# Patient Record
Sex: MALE | Race: WHITE
[De-identification: names, ages, dates, MRNs, and addresses within clinical notes are randomized per-mention and may not be internally consistent; named-entity substitution may affect disease eponyms.]

---

## 2018-08-01 ENCOUNTER — HOSPITAL ENCOUNTER (EMERGENCY)
Dept: HOSPITAL 45 - C.EDB | Age: 28
Discharge: HOME | End: 2018-08-01
Payer: SELF-PAY

## 2018-08-01 VITALS
DIASTOLIC BLOOD PRESSURE: 65 MMHG | OXYGEN SATURATION: 96 % | SYSTOLIC BLOOD PRESSURE: 139 MMHG | HEART RATE: 85 BPM | TEMPERATURE: 98.24 F

## 2018-08-01 VITALS — HEIGHT: 67.99 IN

## 2018-08-01 DIAGNOSIS — S30.1XXA: ICD-10-CM

## 2018-08-01 DIAGNOSIS — S89.91XA: Primary | ICD-10-CM
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
## 2018-08-01 DIAGNOSIS — S80.02XA: ICD-10-CM
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
## 2018-08-01 NOTE — DIAGNOSTIC IMAGING REPORT
RIGHT KNEE 2 VIEWS



HISTORY:      R knee trauma



COMPARISON: None.



FINDINGS: There is no fracture or dislocation. Prepatellar soft tissue swelling

and a small knee effusion. Mild thickening at the distal quadriceps tendon. No

radiopaque foreign bodies.



IMPRESSION:  



1. No fracture or dislocation within the right knee.

2. Prepatellar soft tissue thickening and a small knee effusion.

3. Mild thickening of the distal quadriceps tendon. This could represent a

quadriceps tendon injury.







Electronically signed by:  Marques Tenorio M.D.

8/1/2018 12:05 PM



Dictated Date/Time:  8/1/2018 12:03 PM

## 2018-08-01 NOTE — EMERGENCY ROOM VISIT NOTE
History


First contact with patient:  11:07


Chief Complaint:  LEG PAIN,LEG INJURY


Stated Complaint:  TRUSS FELL ON LEG





History of Present Illness


The patient is a 28 year old male who presents to the Emergency Room with 

complaints of severe right knee pain.  The patient reports that he was helping 

to set a truss when a support board collapsed, and the truss fell approximately 

15 feet onto the right knee.  The patient reports mild left knee discomfort as 

well, but is able to apply weight to the leg without any pain.  He denies any 

injury to the head, neck, chest or abdomen.  He reports mild discomfort in the 

left flank region.  He denies any focal back or rib pain.  He denies any 

paresthesias or numbness of the right foot or toes, and rates his discomfort an 

8 out of 10.  Tetanus immunization is up-to-date.





Review of Systems


10 system review was performed and was negative except for pertinent positives 

and negatives as indicated in history of present illness





Past Medical/Surgical History


Medical Problems:


(1) No significant past medical history


Surgical Problems:


(1) No history of previous surgery








Family History


Unremarkable





Social History


Smoking Status:  Never Smoker


Alcohol Use:  none


Marital Status:  


Housing Status:  lives with family


Occupation Status:  employed





Current/Historical Medications


Scheduled PRN


Oxycodone Ir (Roxicodone Ir), 1 TAB PO Q4H PRN for Pain





Physical Exam


Vital Signs











  Date Time  Temp Pulse Resp B/P (MAP) Pulse Ox O2 Delivery O2 Flow Rate FiO2


 


8/1/18 11:06 36.8 97 20 119/67 94 Room Air  











Physical Exam


CONSTITUTIONAL:  Healthy and well nourished.  Alert and oriented X 3 with 

positive affect.  Patient appears in moderate discomfort.


HEENT:  Normocephalic, atraumatic.  Pupils equal, round and reactive.  


NECK:  Full active range of motion without discomfort.


RESPIRATORY:  Clear to auscultation bilaterally with no wheezing, crackles, 

rhonchi or stridor.


CARDIOVASCULAR:  Regular rate and rhythm with no murmurs, rubs or gallops.


GASTROINTESTINAL:  Bowel sounds present in all quadrants.  Soft and nontender 

to palpation.


MUSCULOSKELETAL: Examination shows a 3+ right joint effusion.  There is a 

superficial abrasion to the anterior knee.  I was unable to perform any range 

of motion because of discomfort.  He has mild tenderness to palpation over the 

medial and lateral joint line.  Varus and valgus stress does not show any 

obvious instability.  Quadriceps and patellar tendons are palpable, with no 

focal tenderness to palpation over the quadriceps or patellar tendons.  Patient 

has no tenderness to palpation about the mid to distal femur or proximal leg 

region.  Pedal pulses are intact.  Patient does have mild tenderness to 

palpation over the left flank soft tissue.  He has no tenderness to palpation 

across the posterior ribs or central thoracolumbar spine.  Examination of the 

left knee shows mild erythema across the anterior aspect of the knee, otherwise 

has no pain with flexion and extension.  Ligamentous exam is normal.


INTEGUMENTARY:  No rash or other significant dermatologic conditions noted.


NEUROLOGIC: Right foot and toes are sensory intact.





Medical Decision & Procedures


ER Provider


Diagnostic Interpretation:


My interpretation of right knee x-rays shows a joint effusion without any 

obvious fracture or dislocation.  Radiologist does question thickening of the 

quadriceps tendon.  Radiologist report is as follows:





RIGHT KNEE 2 VIEWS





HISTORY:      R knee trauma





COMPARISON: None.





FINDINGS: There is no fracture or dislocation. Prepatellar soft tissue swelling


and a small knee effusion. Mild thickening at the distal quadriceps tendon. No


radiopaque foreign bodies.





IMPRESSION:  





1. No fracture or dislocation within the right knee.


2. Prepatellar soft tissue thickening and a small knee effusion.


3. Mild thickening of the distal quadriceps tendon. This could represent a


quadriceps tendon injury.





Medications Administered











 Medications


  (Trade)  Dose


 Ordered  Sig/Justin


 Route  Start Time


 Stop Time Status Last Admin


Dose Admin


 


 Oxycodone HCl


  (Roxicodone


 Immediate Rel Tab)  10 mg  NOW  STAT


 PO  8/1/18 11:12


 8/1/18 11:13 DC 8/1/18 11:25


10 MG











ED Course


Patient history and physical exam were performed.  Nurse's notes were reviewed.

  Vital signs were reviewed and were normal.  The patient was administered 

OxyIR 10 mg for pain.  He refused any intramuscular or intravenous analgesics.  

An ice pack was also applied.  X-rays of the right knee were normal.  The 

radiologist questions thickening of the distal quadriceps tendon region.  It is 

noted that the patient has no tenderness to palpation or obvious palpable 

defect over this region.  A knee immobilizer and crutches were dispensed.  The 

patient was encouraged to intermittently apply ice and elevate the knee for 

swelling.  Ibuprofen and Tylenol in alternating fashion for baseline pain 

relief.  The patient was provided a prescription for OxyIR as needed for 

breakthrough pain.  The patient was given contact information for [facility] for further follow-up and management.  The patient voiced 

understanding of all discharge instructions, was happy with plan of care, and 

rated his discomfort a 3 out of 10 at the time of discharge.





Medical Decision








Blood Pressure Screening


Patient's blood pressure:  Normal blood pressure





Impression





 Primary Impression:  


 Right knee injury


 Additional Impressions:  


 Back contusion


 Contusion of left knee





Departure Information


Prescriptions





Oxycodone Ir (Roxicodone Ir) 5 Mg Tab


1 TAB PO Q4H Y for Pain, #15 TAB


   For Initial Treatment


   Prov: Higinio Thakkar PA         8/1/18





Patient Instructions


My [facility]





Problem Qualifiers

## 2018-08-15 ENCOUNTER — HOSPITAL ENCOUNTER (OUTPATIENT)
Dept: HOSPITAL 45 - X.SURG | Age: 28
Discharge: HOME | End: 2018-08-15
Attending: ORTHOPAEDIC SURGERY
Payer: COMMERCIAL

## 2018-08-15 VITALS
HEIGHT: 69.02 IN | WEIGHT: 180.38 LBS | BODY MASS INDEX: 26.72 KG/M2 | BODY MASS INDEX: 26.72 KG/M2 | HEIGHT: 69.02 IN | WEIGHT: 180.38 LBS

## 2018-08-15 VITALS — HEART RATE: 67 BPM | OXYGEN SATURATION: 97 % | SYSTOLIC BLOOD PRESSURE: 146 MMHG | DIASTOLIC BLOOD PRESSURE: 90 MMHG

## 2018-08-15 VITALS — TEMPERATURE: 98.42 F

## 2018-08-15 DIAGNOSIS — S83.251A: Primary | ICD-10-CM

## 2018-08-15 DIAGNOSIS — S86.811A: ICD-10-CM

## 2018-08-15 DIAGNOSIS — S83.411A: ICD-10-CM

## 2018-08-15 DIAGNOSIS — S83.511A: ICD-10-CM

## 2018-08-15 DIAGNOSIS — S83.521A: ICD-10-CM

## 2018-08-15 DIAGNOSIS — M89.9: ICD-10-CM
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
## 2018-08-15 NOTE — [facility] POST OPERATIVE BRIEF NOTE
Immediate Operative Summary


Operative Date


Aug 15, 2018.





Pre-Operative Diagnosis





Right Knee Lateral Meniscus Tear, Medial Collateral Ligament Tear, Patella 


Tendon Rupture, ACL Tear





Post-Operative Diagnosis





Right Knee Lateral Meniscus Tear, Medial Collateral Ligament Tear, Patella 


Tendon Rupture, ACL Tear, Partial PCL Tear, Grade 4 Chondral injury to LFC





Procedure(s) Performed





Right Knee Arthroscopy, Partial Lateral Meniscectomy, Patella Tendon Repair





Surgeon


Dr. Hernandes





Assistant Surgeon(s)


JENNIFER Ortiz PA-C





Estimated Blood Loss


Minimal





Findings


Consistent with Post-Op Diagnosis





Specimens





None





Drains


None





Anesthesia Type


General





Complication(s)


none





Disposition


Accompanied Pt To Recovery:  no


Disposition:  Recovery Room / PACU

## 2018-08-15 NOTE — ANESTHESIA PROGRESS NT - [facility]
Anesthesia Post Op Note


Date & Time


Aug 15, 2018 at 14:13





Vital Signs


Pain Intensity:  6.0





Vital Signs Past 12 Hours








  Date Time  Temp Pulse Resp B/P (MAP) Pulse Ox O2 Delivery O2 Flow Rate FiO2


 


8/15/18 13:38 37.3 88 16 150/96 100 Mask 6 


 


8/15/18 10:52 37.0 71 16 118/61 (80) 97 Room Air  











Notes


Mental Status:  alert / awake / arousable, participated in evaluation


Pt Amnestic to Procedure:  Yes


Nausea / Vomiting:  adequately controlled


Pain:  adequately controlled


Airway Patency, RR, SpO2:  stable & adequate


BP & HR:  stable & adequate


Hydration State:  stable & adequate


Anesthetic Complications:  no major complications apparent

## 2018-08-15 NOTE — DISCHARGE INSTRUCTIONS-[facility]
Discharge Instructions


Date of Service


Aug 15, 2018.





Visit


Reason for Visit:  Right Knee Lateral Meniscal Tear, Patella Tendon





Discharge


Discharge Diagnosis / Problem:  right knee lateral meniscus tear, ACL/MCL tear, 

patella tendon tear





Discharge Goals


Goal(s):  Decrease discomfort, Improve function, Therapeutic intervention





Medications


Stopped Medications Name(s):  


natural healing products





Activity Recommendations


Activity Limitations:  per Instructions/Follow-up section


Weightbearing Status:  Right weightbearing (as tolerated with brace. Do not 

bend knee )





Anesthesia


.





Post Anesthesia Instructions:





If you have had General Anesthesia or IV Sedation:





*  Do not drive today.


*  Resume driving when surgeon permits.


*  Do not make important decisions or sign legal documents today.


*  Call surgeon for:





   1.  Temperature elevations greater than 101 degrees F.


   2.  Uncontrollable pain.


   3.  Excessive bleeding.


   4.  Persistent nausea and vomiting.


   5.  Medication intolerance (nausea, vomiting or rash).





*  For nausea and vomiting use only clear liquids such as: tea, soda, bouillon 

until nausea subsides, then gradually increase diet as tolerated.





*  If you have any concerns or questions, call your surgeon's office.  If 

physician is unavailable and it is an emergency, call 911 or go to the nearest 

emergency room.





.





Instructions / Follow-Up


Instructions / Follow-Up





MEDICATIONS:





*  Resume previous medications unless instructed otherwise by your surgeon.





*  Always take pain medication on a full stomach or with food to avoid upset 

stomach. 





*  Do not drink alcohol or drive while taking narcotics.





*  Ibuprofen or Tylenol may be taken if narcotic not needed. No ibuprofen while 

taking toradol 








SPECIAL CARE INSTRUCTIONS:





__ None





_x_ Keep extremity elevated and iced x 48 hours; apply ice 20-30


    minutes 8-10 times/day. May remove at night.





_x_ Crutches


    __ May discard when able





_x_ Brace


    _x_ 24 hrs/day (DO NOT bend knee) 


    __ Remove at night





_x_ Dressing


    __ Maintain until seen in office, may shower with plastic over site


    _x_ Remove dressings in 48 hours and then may shower


    _x_ Cover incisions after showering


    __ Do not remove steri-strips





Call physician if chills or temperature rises above 102 degrees or


pain unrelieved by prescribed pain medications. 


Office [phone]








follow up in 2 weeks





Diet Recommendations


Home Diet:  resume previous diet





Procedures


Procedures Performed:  


Right Knee Arthroscopy, Partial Lateral Meniscectomy, Patella Tendon Repair





Pending Studies


Studies pending at discharge:  no





Medical Emergencies








.


Who to Call and When:





Medical Emergencies:  If at any time you feel your situation is an emergency, 

please call 911 immediately.





.





Non-Emergent Contact


Non-Emergency issues call your:  Surgeon





.


.








"Provider Documentation" section prepared by Robin Ortiz.








.

## 2018-08-15 NOTE — OPERATIVE REPORT
DATE OF OPERATION:  08/15/2018

 

SURGEON:  Mitul Hernandes M.D.

 

ASSISTANT:  KUSUM Mckeon

 

PREOPERATIVE DIAGNOSES:

1.  Right knee partial patella tendon rupture.

2.  Right knee bucket handle lateral meniscus tear.

3.  Right knee ACL tear.

4.  Right knee MCL tear.

5.  Right knee grade 1 PCL injury.

 

POSTOPERATIVE DIAGNOSES:

1.  Right knee bucket handle lateral meniscus tear.

2.  Right knee ACL tear.

3.  Right knee partial patella tendon tear.

4.  Right knee MCL tear.

5.  Grade 1 PCL tear.

6.  Large linear osteochondral lesion to the lateral femoral condyle.

 

OPERATIVE INDICATIONS:  The patient is a 28-year-old [facility] gentleman who

injured his knee just about two weeks ago.  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  He had acute onset of

pain, deformity, discomfort and swelling.  He was seen in clinic and his knee

was really swollen and difficult to diagnose.  Seen by my partner Dr. Sandoval. 

Over time with the imaging and MRI as well as clinical exam revealed a

partial or near-complete patellar tendon rupture, bucket handle lateral

meniscus tear, ACL tear, and an MCL tear off the femur.  There was a question

of possible a mild PCL injury as well.  The patient was now indicated for

surgical treatment of the critical portions of this which are the lateral

meniscus tear and the patella tendon tear.  I felt likely that the remainder

of these injuries would likely heal with conservative treatment and could be

addressed later if needed.

 

OPERATIVE FINDINGS:  Examination of the patient's knee under anesthesia

revealed a large knee effusion.  His range of motion passively was 0-135.  He

had a positive Lachman and agreed with a soft endpoint and a grade 2 pivot. 

He had grade 1 posterior drawer test.  He did have valgus laxity with a

fairly good endpoint.  No varus instability.  Kenzie's is negative for

mechanical symptoms.

 

OPERATIVE PROCEDURE:  The patient taken to the operating room, identified and

placed on the operative table in supine position.  All contact areas were

appropriately padded.  IV antibiotics was provided by anesthesia team. 

General anesthetic was implemented by anesthesia team.  A right thigh

tourniquet was then placed.  The right knee was then examined under

anesthesia.  The findings are as described.  The right leg was then prepped

and draped in usual sterile fashion.

 

The right leg was elevated, exsanguinated with Esmarch and tourniquet was

placed at 300 mmHg.  Routine right knee arthroscopy was then performed

through the anteromedial and anterolateral portals.  A superolateral flow

portal was established for outflow.  He had a large hemarthrosis.  It took a

little while to get rid of the hemarthrosis.  I then examined the knee. 

Medial meniscus was intact.  There was clear gapping in the medial

compartment.  There was a large bucket handle lateral meniscus tear.  I tried

to reduce this, but could not reduce it.  Therefore, I detached anteriorly

and then was able to push this posteriorly and then reduce it.  I then

detached the meniscus posteriorly and removed it.  I spent some time trimming

up the remainder of the meniscus.  It was essentially missing the entire

lateral meniscus.  Once this was complete, I examined the ACL and make sure

it was not impinging and it was clearly torn, but not out of position or

flipped anteriorly or cause any impingement.  There was a large osteochondral

lesion lateral femoral condyle, but no loose cartilage or bone that could be

addressed.  The scope was then removed from the knee.  The anterior lateral

portal was closed with 3-0 Prolene suture in a simple fashion.  Attention was

then drawn to the patella tendon repair.

 

The anterior medial portal was then extended superiorly and inferiorly. 

Sharp dissection was carried through subcutaneous tissue down to the level of

the extensor mechanism.  I mobilized the subcutaneous tissue.  I then

identified the partial patellar tendon rupture.  That was not easy to

identify as the lateral portion was still intact and then these fibers were

wavy and shortened.  I debrided the intervening tissue between the torn

tendon and the patella.  I debrided the base of the patella itself to allow

for reattachment.  I then placed a #5 FiberWire suture in a [place] fashion up

and down the medial side of the patellar tendon.  I then passed through drill

holes in the patella, one through the central portion and one through the

medial side.  The patella was then pulled down and tied and the patellar

tendon was reapproximated.  I could then flex the knee to 90 degrees without

any gapping, but that was about the maximum.  Attention was then drawn toward

closing.

 

The wound was irrigated with copious amounts of normal saline.  I injected

the knee with 30 mL of 0.5% ropivacaine with epinephrine.  The superior

lateral outflow portal was then closed with Prolene suture.  I then irrigated

the anterior wound extensively.  I injected locally with 30 mL of 0.5%

Marcaine with epinephrine.  The tourniquet was then let down for a tourniquet

time of 55 minutes.  Hemostasis was assured with use of electrocautery. 

Subcutaneous tissue was then closed with 2-0 Vicryl suture in buried

interrupted fashion.  Skin was closed with 3-0 nylon suture in a horizontal

mattress fashion.  The leg was then cleaned and dried and a sterile dressing

of Xeroform, 4 x 4's, sterile cast padding and Ace bandage were applied

followed by a cold pack and a knee immobilizer.  The patient then brought out

of general anesthesia and transferred to the recovery room in stable

condition.  The patient tolerated the procedure well with no complications. 

All needle and sponge counts were correct at the end of the operation.

 

ARTHROSCOPIC FINDINGS:  Arthroscopic findings revealed a large hemarthrosis. 

The undersurface of patella and trochlea were fairly normal.  In the

intercondylar notch, the ACL was intact.  I could not really see much injury

to the PCL due to the synovium.  In the medial compartment, the articular

surface was normal and the meniscus was intact.  There was gapping of the

medial compartment due to the MCL sprain.  There was fairly good endpoint. 

In the lateral compartment, there was a very large displaced bucket nail tear

of the entire lateral meniscus, which was slipped into the notch.  It was

fairly tight.  He did have a complete osteochondral gouge to the medial

aspect of the lateral femoral condyle.  There were no loose pieces.

 

 

I attest to the content of the Intraoperative Record and any orders documented therein. Any exception
s are noted below.